# Patient Record
(demographics unavailable — no encounter records)

---

## 2019-01-29 NOTE — RAD
CHEST TWO VIEWS:

 

History: Dyspnea. 

 

Comparison: 5-2-16

 

FINDINGS: 

Cardiac silhouette and pulmonary vasculature are unremarkable. Mediastinum is midline. No confluent a
irspace consolidation or evidence of pneumothorax. Posterior costophrenic angles are excluded from th
e lateral view. 

 

IMPRESSION: 

No active cardiopulmonary abnormalities are demonstrated. 

 

POS: [facility]

## 2020-11-14 NOTE — RAD
CHEST ONE VIEW:

11/14/20

 

HISTORY: 

Pain. 

 

COMPARISON: 

7/14/20

 

FINDINGS: 

Normal cardiac silhouette. Pulmonary vessels and hilum are normal. Costophrenic angles are clear. No 
consolidation or mass. No pneumothorax or acute osseous abnormalities. 

 

IMPRESSION: 

No acute cardiopulmonary process. 

 

POS: [facility]